# Patient Record
Sex: FEMALE | URBAN - METROPOLITAN AREA
[De-identification: names, ages, dates, MRNs, and addresses within clinical notes are randomized per-mention and may not be internally consistent; named-entity substitution may affect disease eponyms.]

---

## 2019-03-25 ENCOUNTER — APPOINTMENT (RX ONLY)
Dept: URBAN - METROPOLITAN AREA CLINIC 58 | Facility: CLINIC | Age: 29
Setting detail: DERMATOLOGY
End: 2019-03-25

## 2019-03-25 DIAGNOSIS — L81.0 POSTINFLAMMATORY HYPERPIGMENTATION: ICD-10-CM | Status: STABLE

## 2019-03-25 DIAGNOSIS — D22 MELANOCYTIC NEVI: ICD-10-CM | Status: UNCHANGED

## 2019-03-25 DIAGNOSIS — L59.0 ERYTHEMA AB IGNE [DERMATITIS AB IGNE]: ICD-10-CM | Status: STABLE

## 2019-03-25 PROBLEM — D22.71 MELANOCYTIC NEVI OF RIGHT LOWER LIMB, INCLUDING HIP: Status: ACTIVE | Noted: 2019-03-25

## 2019-03-25 PROCEDURE — ? COUNSELING

## 2019-03-25 PROCEDURE — 99203 OFFICE O/P NEW LOW 30 MIN: CPT

## 2019-03-25 ASSESSMENT — LOCATION SIMPLE DESCRIPTION DERM
LOCATION SIMPLE: RIGHT CALF
LOCATION SIMPLE: RIGHT POPLITEAL SKIN
LOCATION SIMPLE: RIGHT ANKLE
LOCATION SIMPLE: RIGHT CALF
LOCATION SIMPLE: RIGHT POSTERIOR THIGH
LOCATION SIMPLE: RIGHT ANKLE
LOCATION SIMPLE: RIGHT POSTERIOR THIGH

## 2019-03-25 ASSESSMENT — SEVERITY ASSESSMENT
SEVERITY: MILD
SEVERITY: MILD TO MODERATE

## 2019-03-25 ASSESSMENT — PAIN INTENSITY VAS: HOW INTENSE IS YOUR PAIN 0 BEING NO PAIN, 10 BEING THE MOST SEVERE PAIN POSSIBLE?: NO PAIN

## 2019-03-25 ASSESSMENT — LOCATION DETAILED DESCRIPTION DERM
LOCATION DETAILED: RIGHT DISTAL CALF
LOCATION DETAILED: RIGHT DISTAL CALF
LOCATION DETAILED: RIGHT DISTAL POSTERIOR THIGH
LOCATION DETAILED: RIGHT POPLITEAL SKIN
LOCATION DETAILED: RIGHT POSTERIOR ANKLE
LOCATION DETAILED: RIGHT POSTERIOR ANKLE
LOCATION DETAILED: RIGHT PROXIMAL CALF
LOCATION DETAILED: RIGHT DISTAL POSTERIOR THIGH

## 2019-03-25 ASSESSMENT — LOCATION ZONE DERM
LOCATION ZONE: LEG
LOCATION ZONE: LEG

## 2019-03-25 ASSESSMENT — BSA RASH: BSA RASH: 15

## 2019-03-25 NOTE — PROCEDURE: COUNSELING
Detail Level: Detailed
Patient Specific Counseling (Will Not Stick From Patient To Patient): Brown in color, no necrosis. Discussed with patient work up for [place] Ritucularis vs Vasculitis but patient deferred as she is confident this occurred because of her space heater. Discussed that [address] can take months to resolve on its own. Recommended to avoid irritants to the area and sun exposure. If discoloration appears in other leg, further work up is required. \\nIt is too soon to treat with bleaching creams, it has been one week since discoloration occurred.

## 2019-04-08 ENCOUNTER — APPOINTMENT (RX ONLY)
Dept: URBAN - METROPOLITAN AREA CLINIC 58 | Facility: CLINIC | Age: 29
Setting detail: DERMATOLOGY
End: 2019-04-08

## 2019-04-08 DIAGNOSIS — L81.0 POSTINFLAMMATORY HYPERPIGMENTATION: ICD-10-CM

## 2019-04-08 DIAGNOSIS — L30.9 DERMATITIS, UNSPECIFIED: ICD-10-CM

## 2019-04-08 PROCEDURE — ? COUNSELING

## 2019-04-08 PROCEDURE — ? PRESCRIPTION

## 2019-04-08 PROCEDURE — 99213 OFFICE O/P EST LOW 20 MIN: CPT

## 2019-04-08 RX ORDER — SALICYLIC ACID 3 G/100G
LOTION TOPICAL QAM
Qty: 30 | Refills: 0 | Status: ERX | COMMUNITY
Start: 2019-04-08

## 2019-04-08 RX ORDER — HYDROQUINONE 4 %
CREAM (GRAM) TOPICAL QHS
Qty: 1 | Refills: 0 | Status: ACTIVE

## 2019-04-08 RX ORDER — TRIAMCINOLONE ACETONIDE 1 MG/G
CREAM TOPICAL BID
Qty: 1 | Refills: 0 | Status: ERX | COMMUNITY
Start: 2019-04-08

## 2019-04-08 RX ADMIN — TRIAMCINOLONE ACETONIDE: 1 CREAM TOPICAL at 18:03

## 2019-04-08 RX ADMIN — SALICYLIC ACID: 3 LOTION TOPICAL at 18:04

## 2019-04-08 ASSESSMENT — LOCATION DETAILED DESCRIPTION DERM
LOCATION DETAILED: LEFT ANTERIOR PROXIMAL THIGH
LOCATION DETAILED: RIGHT PROXIMAL PRETIBIAL REGION
LOCATION DETAILED: LEFT DISTAL PRETIBIAL REGION
LOCATION DETAILED: RIGHT DISTAL PRETIBIAL REGION
LOCATION DETAILED: LEFT ANTERIOR DISTAL THIGH
LOCATION DETAILED: RIGHT LATERAL POSTERIOR ANKLE
LOCATION DETAILED: RIGHT PROXIMAL CALF
LOCATION DETAILED: RIGHT POSTERIOR ANKLE
LOCATION DETAILED: RIGHT DISTAL CALF
LOCATION DETAILED: LEFT KNEE
LOCATION DETAILED: RIGHT KNEE
LOCATION DETAILED: RIGHT ANTERIOR DISTAL THIGH
LOCATION DETAILED: RIGHT ANTERIOR PROXIMAL THIGH
LOCATION DETAILED: LEFT PROXIMAL PRETIBIAL REGION

## 2019-04-08 ASSESSMENT — LOCATION SIMPLE DESCRIPTION DERM
LOCATION SIMPLE: LEFT PRETIBIAL REGION
LOCATION SIMPLE: RIGHT ANKLE
LOCATION SIMPLE: RIGHT THIGH
LOCATION SIMPLE: RIGHT KNEE
LOCATION SIMPLE: RIGHT PRETIBIAL REGION
LOCATION SIMPLE: LEFT KNEE
LOCATION SIMPLE: LEFT THIGH
LOCATION SIMPLE: RIGHT CALF

## 2019-04-08 ASSESSMENT — LOCATION ZONE DERM: LOCATION ZONE: LEG
